# Patient Record
(demographics unavailable — no encounter records)

---

## 2019-06-10 NOTE — NUR
SPIRITUAL CARE - Pre-Surgery



Assessment:  Pt in bed. Pt's wife at bedside. Pt reported supportive attention from family 
and friends. 

Intervention: I provided pastoral presence, hospitality,and sympathetic listening.  I 
acquainted pt with availability of  while hospitalized.

Outcome: Pt expressed appreciation for visit. No need for follow up indicated at this time. 



[facility] Lowerylain

Spiritual Care Department

O: [phone]

Pager: [phone] ([phone] number [phone] from)

## 2019-06-10 NOTE — OPERATIVE REPORT
DATE OF PROCEDURE:  06/10/2019

 

SURGEON:  Dandy Almaguer MD

 

PROCEDURE:  Colonoscopy and polypectomy.

 

INDICATIONS FOR COLONOSCOPY:  Colorectal cancer screening.

 

MEDICATIONS:  The patient was done under MAC, please see anesthesiologist's note.

 

PROCEDURE IN DETAIL:  With the patient in left lateral decubitus position, the flexible

fiberoptic Olympus colonoscope was inserted into the rectum with ease and advanced all

the way to the cecum.  The scope was then withdrawn slowly.  Mucosa overlying the cecum

and ascending colon appeared to be within normal limits.  One polyp was snared from the

transverse colon.  The descending colon appeared to be within normal limits.  Two polyps

were snared, one polyp was hot biopsied from the sigmoid colon.  Two polyps were snared,

one polyp was hot biopsied from the rectum.  The scope was then retroflexed into the

distal rectum and small internal hemorrhoids were noted none of which was actively

bleeding.  I repeat moderate-sized internal hemorrhoids were noted none of which was

actively bleeding.  The scope was then straightened out.  It was subsequently withdrawn.

 The patient tolerated procedure well.  External hemorrhoids were also noted. 

 

IMPRESSION:  

1. Transverse colon polyp snared.

2. Sigmoid colon polyps x3, two snared, one hot biopsied.

3. Rectum, two polyps were snared and one polyp was hot biopsied.

4. Internal hemorrhoids, none actively bleeding.

5. External hemorrhoids.

 

PLAN:  Follow up histology.  Initiate high-fiber, low-fat diet.  Initiate high-fiber

supplement.  The patient might benefit from a followup colonoscopy in 3 years. 

 

 

 

 

______________________________

Dandy Almaguer MD

 

[facility] [place]

D:  06/10/2019 09:54:55

T:  06/10/2019 16:52:44

Job #:  [id number]

 

cc:            MD Yonatan Fraser MD

## 2019-06-19 NOTE — OPERATIVE REPORT
DATE OF PROCEDURE:  06/19/2019

 

SURGEON:  Bartolome Gill MD

 

PREOPERATIVE DIAGNOSIS:  Thrombosed prolapsing internal and external hemorrhoids.

 

POSTOPERATIVE DIAGNOSIS:  Thrombosed prolapsing internal and external hemorrhoids.

 

OPERATION PERFORMED:  Internal and external hemorrhoidectomy.

 

ASSISTANT:  LEYLA Ramírez.

 

ANESTHESIA:  General.

 

COMPLICATIONS:  None.

 

ESTIMATED BLOOD LOSS:  Minimal.

 

PROCEDURE IN DETAIL:  With the patient lying in bed in the lithotomy position, under

good general anesthesia, the perineum was prepped with Betadine solution and draped in

the usual manner.  A complete anorectal block was then performed with 0.25% Marcaine and

1% lidocaine with epinephrine mixed in equal parts.  Examination at this point revealed

there was a large prolapsing thrombosed hemorrhoidal group extending from about the 2

o'clock to about the 6 o'clock position.  There was a small hemorrhoidal group at 8

o'clock and another small hemorrhoidal group at the 12 o'clock position.  The hemorrhoid

group at the 4 o'clock position was then ligated with a 0 chromic suture.  The external

component was then slowly and carefully resected in a submucous fashion and was totally

completely removed.  The base of the hemorrhoid was then further oversewn with the 0

chromic suture and the skin and mucosa were then reapproximated with interrupted sutures

of 3-0 chromic.  After this was done, the hemorrhoids at the 12 o'clock and 8 o'clock

position were ligated with 0 chromic suture.  Hemostasis was ascertained.  A Gelfoam

pack impregnated with Xylocaine was placed.  A dressing was applied.  The sponge, lap,

and needle count was correct.  The patient tolerated the procedure well and returned to

the recovery room in stable condition. 

 

 

 

 

______________________________

Bartolome Gill MD

 

[facility]

D:  06/19/2019 11:30:49

T:  06/19/2019 11:51:50

Job #:  [id number]

## 2019-06-19 NOTE — NUR
Patient arrived from PACU, fully awake, alert and able to ambulate to bathroom to void with 
standby assistance. Rectal dressing intact. Vital signs stable and pt orient to new room. 
Bed in lowest position, locked and call bell within.

## 2019-06-20 NOTE — NUR
Dr. Gill making rounds. Patient will be discharged home today. Written instructions 
given patient verbalized understanding. Awaiting ride

## 2019-06-20 NOTE — NUR
Patient up ambulating to bathroom without any complaints voiced at this time. Dressing/ 
rectal packing in place. Patient instructed to call for assistance as needed and verbalized 
understanding. Call bell within reach.